# Patient Record
Sex: FEMALE | Race: WHITE | NOT HISPANIC OR LATINO | URBAN - METROPOLITAN AREA
[De-identification: names, ages, dates, MRNs, and addresses within clinical notes are randomized per-mention and may not be internally consistent; named-entity substitution may affect disease eponyms.]

---

## 2022-07-09 ENCOUNTER — TELEPHONE ENCOUNTER (OUTPATIENT)
Dept: URBAN - METROPOLITAN AREA CLINIC 121 | Facility: CLINIC | Age: 62
End: 2022-07-09

## 2022-07-10 ENCOUNTER — TELEPHONE ENCOUNTER (OUTPATIENT)
Dept: URBAN - METROPOLITAN AREA CLINIC 121 | Facility: CLINIC | Age: 62
End: 2022-07-10

## 2022-07-10 RX ORDER — LISINOPRIL AND HYDROCHLOROTHIAZIDE TABLETS 10; 12.5 MG/1; MG/1
TABLET ORAL ONCE A DAY
Refills: 0 | Status: ACTIVE | COMMUNITY
Start: 2014-04-17

## 2022-07-10 RX ORDER — ESTRADIOL 1 MG/1
TABLET ORAL ONCE A DAY
Refills: 0 | Status: ACTIVE | COMMUNITY
Start: 2014-03-06

## 2022-07-10 RX ORDER — MEDROXYPROGESTERONE ACETATE 2.5 MG/1
TABLET ORAL ONCE A DAY
Refills: 0 | Status: ACTIVE | COMMUNITY
Start: 2014-03-06

## 2023-10-27 ENCOUNTER — NEW PATIENT (OUTPATIENT)
Dept: URBAN - METROPOLITAN AREA CLINIC 31 | Facility: CLINIC | Age: 63
End: 2023-10-27

## 2023-10-27 DIAGNOSIS — H52.13: ICD-10-CM

## 2023-10-27 PROCEDURE — 92015 DETERMINE REFRACTIVE STATE: CPT

## 2023-10-27 PROCEDURE — 92004 COMPRE OPH EXAM NEW PT 1/>: CPT

## 2023-10-27 ASSESSMENT — VISUAL ACUITY
OD_SC: 20/40-2
OU_CC: J1+
OS_SC: 20/40-2
OS_CC: 20/20
OD_CC: 20/20

## 2023-10-27 ASSESSMENT — TONOMETRY
OD_IOP_MMHG: 16
OS_IOP_MMHG: 15

## 2024-12-05 ENCOUNTER — P2P PATIENT RECORD (OUTPATIENT)
Age: 64
End: 2024-12-05

## 2025-01-07 ENCOUNTER — OFFICE VISIT (OUTPATIENT)
Dept: URBAN - METROPOLITAN AREA CLINIC 63 | Facility: CLINIC | Age: 65
End: 2025-01-07
Payer: COMMERCIAL

## 2025-01-07 ENCOUNTER — LAB OUTSIDE AN ENCOUNTER (OUTPATIENT)
Dept: URBAN - METROPOLITAN AREA CLINIC 63 | Facility: CLINIC | Age: 65
End: 2025-01-07

## 2025-01-07 ENCOUNTER — DASHBOARD ENCOUNTERS (OUTPATIENT)
Age: 65
End: 2025-01-07

## 2025-01-07 VITALS
DIASTOLIC BLOOD PRESSURE: 90 MMHG | BODY MASS INDEX: 39.05 KG/M2 | WEIGHT: 243 LBS | OXYGEN SATURATION: 99 % | TEMPERATURE: 97.1 F | HEIGHT: 66 IN | SYSTOLIC BLOOD PRESSURE: 130 MMHG | HEART RATE: 67 BPM

## 2025-01-07 DIAGNOSIS — D50.9 ANEMIA: ICD-10-CM

## 2025-01-07 DIAGNOSIS — Z12.11 COLON CANCER SCREENING: ICD-10-CM

## 2025-01-07 PROBLEM — 87522002: Status: ACTIVE | Noted: 2025-01-07

## 2025-01-07 PROCEDURE — 99213 OFFICE O/P EST LOW 20 MIN: CPT

## 2025-01-07 RX ORDER — LISINOPRIL AND HYDROCHLOROTHIAZIDE TABLETS 10; 12.5 MG/1; MG/1
TABLET ORAL ONCE A DAY
Refills: 0 | Status: ACTIVE | COMMUNITY

## 2025-01-07 RX ORDER — MEDROXYPROGESTERONE ACETATE 2.5 MG/1
TABLET ORAL ONCE A DAY
Refills: 0 | Status: ACTIVE | COMMUNITY

## 2025-01-07 RX ORDER — MULTIVIT-MINERALS/FOLIC ACID 80 MCG
AS DIRECTED TABLET,CHEWABLE ORAL
Status: ACTIVE | COMMUNITY

## 2025-01-07 RX ORDER — ESTRADIOL 1 MG/1
TABLET ORAL ONCE A DAY
Refills: 0 | Status: ACTIVE | COMMUNITY

## 2025-01-07 NOTE — HPI-PREVIOUS PROCEDURES
Colonoscopy, 5/14/2014, Dr. Griffith - Tortuous colon. - Internal hemorrhoids. - Repeat colonoscopy in 10 years for screening purposes. .

## 2025-01-07 NOTE — HPI-PREVIOUS LABS
Labs dated 11/22/2024 Lipid panel - Total cholesterol 201 - LDL cholesterol 133 - Non-HDL cholesterol 148 . Rest within normal limits . CMP - BUN 26 - BUN/creatinine ratio 27 - Rest within normal limits . Labs dated 7/24/2024 Iron panel - Ferritin; within normal limits - Total iron 29 (decreased) - TIBC 241 (decreased) - Percent saturation 12 (decreased) - Transferrin 185 (low)

## 2025-01-07 NOTE — HPI-TODAY'S VISIT:
This is a very pleasant 64-year-old female who presents to the office with a chief complaint of "anemia".  Past medical history is significant for hypertension, GERD, HUMBERTO.  Past surgical history is significant for tonsillectomy, arthroscopic knee surgery, total knee replacement.  Family history is noncontributory. Last colonoscopy 4/15/2015, Dr. Griffith, 10-year recall Patient is endoscopy naive. Cardiologist:? . Patient was last seen in the office on 4/17/2014 with Dr. Griffith for a colon screening.  At that visit, patient was having intermittent episodes of abdominal bloating and bowel accidents.  Screening colonoscopy was ordered. . Patient presents today explaining she was referred to our office due to anemia.  Labs dated 7/24/2024 revealed a decreased total iron decreased TIBC, decreased transferrin, as well as decreased percent saturation.  She does explain that she takes Celebrex 200 mg daily, but denies OTC NSAID use, and denies a history of H. pylori. She explains she takes Celebrex for OA, I encouraged her to use tylenol instead, or to discuss with the prescribing provider to temporarily discontinue.  She denies witnessing bleeding, and denies vaginal bleeding, BRBPR, hematochezia, melena, hemoptysis.  I explained to the patient we will order upper and lower endoscopy to assess for source of bleeding.  I explained I will order new labs for her to complete to assess for improvement or worsening of anemia.  Additionally patient is due for colorectal cancer screening, colonoscopy ordered for surveillance today.  Patient will follow-up after procedure. . Patient denies abdominal pain, belching, bloating, constipation, diarrhea, dysphagia, pyrosis, melena, hematochezia, hematemesis, nausea, vomiting, BRBPR, and unintentional weight loss.

## 2025-04-25 ENCOUNTER — CLAIMS CREATED FROM THE CLAIM WINDOW (OUTPATIENT)
Dept: URBAN - METROPOLITAN AREA SURGERY CENTER 4 | Facility: SURGERY CENTER | Age: 65
End: 2025-04-25
Payer: COMMERCIAL

## 2025-04-25 DIAGNOSIS — K31.7 POLYP OF STOMACH AND DUODENUM: ICD-10-CM

## 2025-04-25 DIAGNOSIS — K29.70 GASTRITIS WITHOUT BLEEDING, UNSPECIFIED CHRONICITY, UNSPECIFIED GASTRITIS TYPE: ICD-10-CM

## 2025-04-25 DIAGNOSIS — K64.1 SECOND DEGREE HEMORRHOIDS: ICD-10-CM

## 2025-04-25 DIAGNOSIS — K63.5 COLON POLYP: ICD-10-CM

## 2025-04-25 DIAGNOSIS — K44.9 DIAPHRAGMATIC HERNIA WITHOUT OBSTRUCTION OR GANGRENE: ICD-10-CM

## 2025-04-25 PROCEDURE — 45385 COLONOSCOPY W/LESION REMOVAL: CPT | Performed by: INTERNAL MEDICINE

## 2025-04-25 PROCEDURE — 43239 EGD BIOPSY SINGLE/MULTIPLE: CPT | Performed by: INTERNAL MEDICINE

## 2025-04-25 RX ORDER — LISINOPRIL AND HYDROCHLOROTHIAZIDE TABLETS 10; 12.5 MG/1; MG/1
TABLET ORAL ONCE A DAY
Refills: 0 | Status: ACTIVE | COMMUNITY

## 2025-04-25 RX ORDER — ESTRADIOL 1 MG/1
TABLET ORAL ONCE A DAY
Refills: 0 | Status: ACTIVE | COMMUNITY

## 2025-04-25 RX ORDER — MULTIVIT-MINERALS/FOLIC ACID 80 MCG
AS DIRECTED TABLET,CHEWABLE ORAL
Status: ACTIVE | COMMUNITY

## 2025-04-25 RX ORDER — MEDROXYPROGESTERONE ACETATE 2.5 MG/1
TABLET ORAL ONCE A DAY
Refills: 0 | Status: ACTIVE | COMMUNITY

## 2025-05-09 ENCOUNTER — LAB OUTSIDE AN ENCOUNTER (OUTPATIENT)
Dept: URBAN - METROPOLITAN AREA CLINIC 63 | Facility: CLINIC | Age: 65
End: 2025-05-09

## 2025-05-09 ENCOUNTER — OFFICE VISIT (OUTPATIENT)
Dept: URBAN - METROPOLITAN AREA CLINIC 63 | Facility: CLINIC | Age: 65
End: 2025-05-09
Payer: COMMERCIAL

## 2025-05-09 DIAGNOSIS — D50.9 ANEMIA: ICD-10-CM

## 2025-05-09 PROCEDURE — 99213 OFFICE O/P EST LOW 20 MIN: CPT

## 2025-05-09 RX ORDER — MULTIVIT-MINERALS/FOLIC ACID 80 MCG
AS DIRECTED TABLET,CHEWABLE ORAL
Status: ACTIVE | COMMUNITY

## 2025-05-09 RX ORDER — MONTELUKAST 10 MG/1
1 TABLET TABLET, FILM COATED ORAL ONCE A DAY
Status: ACTIVE | COMMUNITY

## 2025-05-09 RX ORDER — BUPROPION HYDROCHLORIDE 75 MG/1
1 TABLET TABLET, FILM COATED ORAL TWICE A DAY
Status: ACTIVE | COMMUNITY

## 2025-05-09 RX ORDER — MEDROXYPROGESTERONE ACETATE 2.5 MG/1
TABLET ORAL ONCE A DAY
Refills: 0 | Status: ACTIVE | COMMUNITY

## 2025-05-09 RX ORDER — CELECOXIB 200 MG/1
1 CAPSULE AS NEEDED CAPSULE ORAL ONCE A DAY
Status: ACTIVE | COMMUNITY

## 2025-05-09 RX ORDER — LISINOPRIL AND HYDROCHLOROTHIAZIDE TABLETS 10; 12.5 MG/1; MG/1
TABLET ORAL ONCE A DAY
Refills: 0 | Status: ACTIVE | COMMUNITY

## 2025-05-09 RX ORDER — PANTOPRAZOLE SODIUM 40 MG/1
1 TABLET 1/2 TO 1 HOUR BEFORE MORNING MEAL TABLET, DELAYED RELEASE ORAL ONCE A DAY
Status: ACTIVE | COMMUNITY

## 2025-05-09 RX ORDER — ESTRADIOL 1 MG/1
TABLET ORAL ONCE A DAY
Refills: 0 | Status: ACTIVE | COMMUNITY

## 2025-05-09 NOTE — HPI-TODAY'S VISIT:
This is a very pleasant 64-year-old female who presents to the office with a chief complaint of "anemia F/U".  Past medical history is significant for hypertension, GERD, HUMBERTO.  Past surgical history is significant for tonsillectomy, arthroscopic knee surgery, total knee replacement.  Family history is noncontributory. Last colonoscopy 4/25/2025, Dr. Rivera, 1-year recall Last EGD, 4/25/2025, Dr. Rivera Cardiologist: none. . Patient was last seen in the office on 1/7/2025 for anemia.  At last visit on labs from 7/24/2024 she had a decreased total iron, decreased TIBC, decreased transferrin, and decreased percent saturation.  She takes Celebrex 200 mg daily but denies NSAID use or.  Any known history of H. pylori I explained we will order upper and lower endoscopy to assess for bleeding source and ordered new labs for her to complete to assess for improvement or worsening anemia. . Patient presents today explaining she tolerated both procedures well and without complications.  I reviewed patient's colonoscopy and EGD op and pathology report with her in detail and answered all questions.  I explained that no source of bleeding was seen however a 20 mm polyp was removed and a hemostatic clip was placed. Patient was given 1 year recall colonoscopy.  I explained to the patient that no source of bleeding was seen on the procedure and due to this we will progress to capsule endoscopy.  I explained that we will order upper GI series with small bowel follow-through first, patient is agreeable.  Labs from 5/6/2025 show a hemoglobin of 11.5. Patient continues to deny lightheadedness, dizziness, or syncopal episodes. . Patient denies abdominal pain, belching, bloating, constipation, diarrhea, dysphagia, pyrosis, melena, hematochezia, hematemesis, nausea, vomiting, BRBPR, and unintentional weight loss.

## 2025-05-09 NOTE — HPI-PREVIOUS LABS
Labs dated 5/6/2025 - Hemoglobin 11.5 - MCH 26.7 - MCHC 30.7 - Rest within normal limits . Iron panel - Total iron 35 - TIBC 237 - Percent saturation 15 - Ferritin within normal limits . Labs dated 11/22/2024 Lipid panel - Total cholesterol 201 - LDL cholesterol 133 - Non-HDL cholesterol 148 . Rest within normal limits . CMP - BUN 26 - BUN/creatinine ratio 27 - Rest within normal limits . Labs dated 7/24/2024 Iron panel - Ferritin; within normal limits - Total iron 29 (decreased) - TIBC 241 (decreased) - Percent saturation 12 (decreased) - Transferrin 185 (low)

## 2025-05-09 NOTE — HPI-PREVIOUS PROCEDURES
Colonoscopy, 4/25/2025, Dr. Rivera - Internal hemorrhoids. - A 20 mm polyp in the proximal ascending colon, resected and retrieved. - 1 hemostatic clip was placed successfully in the proximal ascending colon. - Repeat colonoscopy in 1 year for surveillance based on pathology results, per Dr. Rivera. . EGD, 4/25/2025, Dr. Rivera - Normal duodenal bulb and second portion of the duodenum.  Biopsy. - Gastritis.  Biopsied. - 2 cm hiatal hernia. - Normal esophagus.  Biopsy. - A few gastric polyps.  Biopsy. . Colonoscopy and EGD pathology report, 4/25/2025 - Duodenum, second part biopsy; no significant abnormality. - Stomach, antrum biopsy; chronic gastritis, nonspecific.  No evidence of H. pylori organisms or intestinal metaplasia.  Negative for dysplasia or malignancy. - Fundus, stomach biopsy; chemical/reactive gastropathy.  No evidence of H. pylori organisms or intestinal metaplasia.  Negative for dysplasia or malignancy. - Distal esophagus biopsy; squamous mucosa without significant abnormality, no columnar mucosa identified.  No evidence of infectious organisms or eosinophilic esophagitis.  Negative for  dysplasia or malignancy. - Ascending colon polyp; benign mucosal polyp.  See comment.  Negative for dysplasia or malignancy. . Colonoscopy, 5/14/2014, Dr. Griffith - Tortuous colon. - Internal hemorrhoids. - Repeat colonoscopy in 10 years for screening purposes. .

## 2025-05-20 ENCOUNTER — TELEPHONE ENCOUNTER (OUTPATIENT)
Dept: URBAN - METROPOLITAN AREA CLINIC 63 | Facility: CLINIC | Age: 65
End: 2025-05-20

## 2025-05-26 ENCOUNTER — TELEPHONE ENCOUNTER (OUTPATIENT)
Dept: URBAN - METROPOLITAN AREA CLINIC 63 | Facility: CLINIC | Age: 65
End: 2025-05-26

## 2025-08-22 ENCOUNTER — OFFICE VISIT (OUTPATIENT)
Dept: URBAN - METROPOLITAN AREA CLINIC 63 | Facility: CLINIC | Age: 65
End: 2025-08-22